# Patient Record
Sex: MALE | Race: WHITE | NOT HISPANIC OR LATINO | URBAN - METROPOLITAN AREA
[De-identification: names, ages, dates, MRNs, and addresses within clinical notes are randomized per-mention and may not be internally consistent; named-entity substitution may affect disease eponyms.]

---

## 2018-11-20 ENCOUNTER — OFFICE VISIT - HEALTHEAST (OUTPATIENT)
Dept: FAMILY MEDICINE | Facility: CLINIC | Age: 28
End: 2018-11-20

## 2018-11-20 DIAGNOSIS — Z00.00 ROUTINE GENERAL MEDICAL EXAMINATION AT A HEALTH CARE FACILITY: ICD-10-CM

## 2018-11-20 LAB
CHOLEST SERPL-MCNC: 146 MG/DL
FASTING STATUS PATIENT QL REPORTED: YES
FASTING STATUS PATIENT QL REPORTED: YES
GLUCOSE BLD-MCNC: 79 MG/DL (ref 70–99)
HDLC SERPL-MCNC: 48 MG/DL
LDLC SERPL CALC-MCNC: 87 MG/DL
TRIGL SERPL-MCNC: 57 MG/DL

## 2018-11-20 ASSESSMENT — MIFFLIN-ST. JEOR: SCORE: 1535.72

## 2021-06-02 VITALS — WEIGHT: 133 LBS | BODY MASS INDEX: 19.7 KG/M2 | HEIGHT: 69 IN

## 2021-06-26 NOTE — PROGRESS NOTES
Progress Notes by Helen Brown MD at 11/20/2018  1:00 PM     Author: Helen Brown MD Service: -- Author Type: Physician    Filed: 11/20/2018  2:20 PM Encounter Date: 11/20/2018 Status: Signed    : Helen Brown MD (Physician)       MALE PREVENTATIVE EXAM    Assessment and Plan:     Routine general medical examination at a health care facility  We discussed healthy lifestyle, nutrition, cardiovascular risk reduction, self care, safety, sunscreen, seatbelt, and timing of cancer screening.  Health maintenance screening and immunizations reviewed with the patient.  -     Lipid Cascade  -     Glucose    Next follow up:  One year    Immunization Review  Adult Imm Review: Stated as current, no records available  Patient refuses flu shot    I discussed the following with the patient:   Adult Healthy Living: Healthy nutrition  Getting adequate sleep  Stress management  Sunscreen    Subjective:   Chief Complaint: Bryce Garcia is an 28 y.o. male here for a preventative health visit.     HPI:  Patient is in good health, he has no complaints or abnormal symptoms.     Healthy Habits  Are you taking a daily aspirin? No  Do you typically exercising at least 40 min, 3-4 times per week?  Yes  Do you usually eat at least 4 servings of fruit and vegetables a day, include whole grains and fiber and avoid regularly eating high fat foods? NO  Have you had an eye exam in the past two years? Yes  Do you see a dentist twice per year? Yes  Do you have any concerns regarding sleep? No    Safety Screen  If you own firearms, are they secured in a locked gun cabinet or with trigger locks? The patient does not own any firearms  Do you feel you are safe where you are living?: Yes (11/20/2018  1:12 PM)  Do you feel you are safe in your relationship(s)?: Yes (11/20/2018  1:12 PM)      Review of Systems:  Please see above.  The rest of the review of systems are negative for all systems.  "    Cancer Screening     Patient has no health maintenance due at this time        Patient Care Team:  Concepcion Bennett MD as PCP - General (Family Medicine)    History     Reviewed By Date/Time Sections Reviewed    Juanis Timmons CMA 11/20/2018  1:13 PM Tobacco        Objective:   Vital Signs:   Visit Vitals  BP 90/60 (Patient Site: Left Arm, Patient Position: Sitting, Cuff Size: Adult Regular)   Pulse 70   Temp 97.8  F (36.6  C) (Oral)   Ht 5' 8.5\" (1.74 m)   Wt 133 lb (60.3 kg)   BMI 19.93 kg/m           PHYSICAL EXAM    Objective:    Physical Exam   Vitals:    11/20/18 1313   BP: 90/60   Pulse: 70   Temp: 97.8  F (36.6  C)      Constitutional: Patient is oriented to person, place, and time. Patient appears well-developed and well-nourished. No distress.   Head: Normocephalic and atraumatic.   Right Ear: External ear normal. Normal TM  Left Ear: External ear normal. Normal TM  Nose: Nose normal.   Mouth/Throat: Oropharynx is clear and moist. No oropharyngeal exudate.   Eyes: Conjunctivae and EOM are normal. Pupils are equal, round, and reactive to light. Right eye exhibits no discharge. Left eye exhibits no discharge. No scleral icterus.   Neck: Neck supple. No JVD present. No tracheal deviation present. No thyromegaly present.   Cardiovascular: Normal rate, regular rhythm, normal heart sounds and intact distal pulses. No murmur heard.   Pulmonary/Chest: Effort normal and breath sounds normal. No stridor. No respiratory distress. Patient has no wheezes, no rales, exhibits no tenderness.   Abdominal: Soft. Bowel sounds are normal. Patient exhibits no distension and no mass. There is no tenderness. There is no rebound and no guarding.   Lymphadenopathy:  Patient has no cervical adenopathy.   Neurological: Patient is alert and oriented to person, place, and time. Patient has normal reflexes. No cranial nerve deficit. Coordination normal.   Skin: Skin is warm and dry. No rash noted. Patient is not " diaphoretic. No erythema. No pallor.        Medication List           Accurate as of 11/20/18  2:19 PM. If you have any questions, ask your nurse or doctor.               CONTINUE taking these medications    ergocalciferol 50,000 unit capsule  Also known as:  ERGOCALCIFEROL  INSTRUCTIONS:  Take 50,000 Units by mouth once a week.               Additional Screenings Completed Today:     ADDITIONAL HISTORY SUMMARIZED (2): None.   DECISION TO OBTAIN EXTRA INFORMATION (1): None.   RADIOLOGY TESTS (1): None.  LABS (1): Labs ordered today.  MEDICINE TESTS (1): None.  INDEPENDENT REVIEW (2 each): None.     Total data points = 1    Total time was 8 minutes, greater than 50% counseling and coordinating care regarding the above issues.    By signing my name below, I, Ml Anderson, attest that this documentation has been prepared under the direction and in the presence of Dr. Eleonora Uriostegui.  Electronic Signature: Bravo Trujillo. 11/20/2018 13:35.    I, Dr. Helen Love MD , personally performed the services described in this documentation. All medical record entries made by the scribe were at my direction and in my presence. I have reviewed the chart and discharge instructions (if applicable) and agree that the record reflects my personal performance and is accurate and complete.

## 2021-06-27 ENCOUNTER — HEALTH MAINTENANCE LETTER (OUTPATIENT)
Age: 31
End: 2021-06-27

## 2021-10-17 ENCOUNTER — HEALTH MAINTENANCE LETTER (OUTPATIENT)
Age: 31
End: 2021-10-17

## 2022-07-23 ENCOUNTER — HEALTH MAINTENANCE LETTER (OUTPATIENT)
Age: 32
End: 2022-07-23

## 2022-10-01 ENCOUNTER — HEALTH MAINTENANCE LETTER (OUTPATIENT)
Age: 32
End: 2022-10-01

## 2023-08-12 ENCOUNTER — HEALTH MAINTENANCE LETTER (OUTPATIENT)
Age: 33
End: 2023-08-12